# Patient Record
Sex: FEMALE | Race: WHITE | Employment: FULL TIME | URBAN - METROPOLITAN AREA
[De-identification: names, ages, dates, MRNs, and addresses within clinical notes are randomized per-mention and may not be internally consistent; named-entity substitution may affect disease eponyms.]

---

## 2019-06-12 ENCOUNTER — OFFICE VISIT (OUTPATIENT)
Dept: FAMILY MEDICINE CLINIC | Age: 54
End: 2019-06-12
Payer: COMMERCIAL

## 2019-06-12 VITALS
DIASTOLIC BLOOD PRESSURE: 86 MMHG | BODY MASS INDEX: 23.8 KG/M2 | HEART RATE: 52 BPM | OXYGEN SATURATION: 97 % | WEIGHT: 170 LBS | SYSTOLIC BLOOD PRESSURE: 122 MMHG | HEIGHT: 71 IN

## 2019-06-12 DIAGNOSIS — Z12.39 SCREENING FOR BREAST CANCER: ICD-10-CM

## 2019-06-12 DIAGNOSIS — Z00.00 ROUTINE PHYSICAL EXAMINATION: Primary | ICD-10-CM

## 2019-06-12 DIAGNOSIS — Z86.69 HISTORY OF MIGRAINE WITH AURA: ICD-10-CM

## 2019-06-12 PROCEDURE — 99386 PREV VISIT NEW AGE 40-64: CPT | Performed by: FAMILY MEDICINE

## 2019-06-12 RX ORDER — SUMATRIPTAN 25 MG/1
25 TABLET, FILM COATED ORAL
Qty: 9 TABLET | Refills: 1 | Status: SHIPPED | OUTPATIENT
Start: 2019-06-12 | End: 2020-07-17 | Stop reason: ALTCHOICE

## 2019-06-12 ASSESSMENT — PATIENT HEALTH QUESTIONNAIRE - PHQ9
SUM OF ALL RESPONSES TO PHQ QUESTIONS 1-9: 0
1. LITTLE INTEREST OR PLEASURE IN DOING THINGS: 0
2. FEELING DOWN, DEPRESSED OR HOPELESS: 0
SUM OF ALL RESPONSES TO PHQ QUESTIONS 1-9: 0
SUM OF ALL RESPONSES TO PHQ9 QUESTIONS 1 & 2: 0

## 2019-06-12 ASSESSMENT — ENCOUNTER SYMPTOMS
COLOR CHANGE: 0
SHORTNESS OF BREATH: 0
EYE DISCHARGE: 0
ABDOMINAL PAIN: 0
EYE REDNESS: 0
NAUSEA: 0

## 2019-06-12 NOTE — PROGRESS NOTES
2019    German Roberts (:  1965) is a 52 y.o. female, here for a preventive medicine evaluation. She has a history of migraine with auras and that she gets them rarely. She started getting them as a teenager. She did have one in the past couple of months where she was driving and she could see they are coming on and so she started testing herself to see if she can remember people's names and she could not. This moves her to tears when she discusses it. She is worried about dementia. She does not have any complaints from the person she lives with about any signs of forgetting things or signs of cognitive impairment. She has taken Imitrex in the past which has helped with the migraines. Review of Systems   Constitutional: Negative for unexpected weight change. HENT: Negative for congestion. Eyes: Negative for discharge and redness. Respiratory: Negative for shortness of breath. Cardiovascular: Negative for chest pain, palpitations and leg swelling. Gastrointestinal: Negative for abdominal pain and nausea. Genitourinary: Negative for flank pain. Musculoskeletal: Negative for gait problem. Skin: Negative for color change and pallor. Neurological: Negative for facial asymmetry and speech difficulty. No current headache   Hematological: Does not bruise/bleed easily. Psychiatric/Behavioral: Negative for confusion. Prior to Visit Medications    Not on File        No Known Allergies    History reviewed. No pertinent past medical history.     Past Surgical History:   Procedure Laterality Date    ADRENAL GLAND SURGERY      tumor removal     ANKLE SURGERY Left     replacement    COLONOSCOPY  10-2-15    HERNIA REPAIR         Social History     Socioeconomic History    Marital status: Single     Spouse name: Not on file    Number of children: Not on file    Years of education: Not on file    Highest education level: Not on file   Occupational History    Not on file   Social Needs    Financial resource strain: Not on file    Food insecurity:     Worry: Not on file     Inability: Not on file    Transportation needs:     Medical: Not on file     Non-medical: Not on file   Tobacco Use    Smoking status: Never Smoker    Smokeless tobacco: Never Used   Substance and Sexual Activity    Alcohol use: Yes     Comment: socially    Drug use: Never    Sexual activity: Yes   Lifestyle    Physical activity:     Days per week: Not on file     Minutes per session: Not on file    Stress: Not on file   Relationships    Social connections:     Talks on phone: Not on file     Gets together: Not on file     Attends Oriental orthodox service: Not on file     Active member of club or organization: Not on file     Attends meetings of clubs or organizations: Not on file     Relationship status: Not on file    Intimate partner violence:     Fear of current or ex partner: Not on file     Emotionally abused: Not on file     Physically abused: Not on file     Forced sexual activity: Not on file   Other Topics Concern    Not on file   Social History Narrative    Not on file       Family History   Problem Relation Age of Onset    High Blood Pressure Mother     Stroke Mother     Depression Father     Depression Brother     Stroke Maternal Grandmother            Vitals:    06/12/19 1016   BP: 122/86   Site: Right Upper Arm   Position: Sitting   Cuff Size: Medium Adult   Pulse: 52   SpO2: 97%   Weight: 170 lb (77.1 kg)   Height: 5' 11\" (1.803 m)     Estimated body mass index is 23.71 kg/m² as calculated from the following:    Height as of this encounter: 5' 11\" (1.803 m). Weight as of this encounter: 170 lb (77.1 kg). Physical Exam   Constitutional: She is oriented to person, place, and time. She appears well-developed and well-nourished. No distress. HENT:   Head: Normocephalic and atraumatic.    Right Ear: External ear normal.   Left Ear: External ear normal.   Nose: Nose normal. Orders   1. Routine physical examination  CBC    Comprehensive Metabolic Panel    Lipid Panel   2. Screening for breast cancer  EUNICE EITAN DIGITAL SCREEN BILATERAL   3. History of migraine with aura       Moriah Ask was seen today for established new doctor. Diagnoses and all orders for this visit:    Routine physical examination  -     CBC; Future  -     Comprehensive Metabolic Panel; Future  -     Lipid Panel; Future    Screening for breast cancer  -     EUNICE EITAN DIGITAL SCREEN BILATERAL; Future    History of migraine with aura    Other orders  -     SUMAtriptan (IMITREX) 25 MG tablet; Take 1 tablet by mouth once as needed for Migraine              An electronic signature was used to authenticate this note. This note is dictated, errors are possible.   --Nu Lam, DO on 6/12/2019 at 11:02 AM

## 2019-06-12 NOTE — PATIENT INSTRUCTIONS
plate that contains the fruit and vegetables to at least 50%, and decrease the amount of starches like potatoes, rice, pasta to no more than 25% of your plate. Increase your fiber intake. Fiber is found in fruits and vegetables as well as whole grains, oatmeal,  and beans. A diet with at least 5 servings of fruits and vegetables should give you about 10-20grams of fiber daily. Switch to monounsaturated fats like olive oil. . Use these types of fats where you would have used butter. Fat from olives, avocados, coconut, or other nuts is okay. Add baked or grilled fish to you diet at least 1-2 times a week. Consider an Omega 3 fatty acid supplement which can raise the HDL good cholesterol, and possible decrease the triglycerides in some patients. 2-4 grams a day is recommended. Learn more about cholesterol on the Internet. Check out these resources:    American Heart Association   Heart. [address]:   [place]    Triglycerides can be lowered without medication by exercising, and loosing weight and eating a diet lower in carbohydrates especially from flour sources,  and avoiding simple sugars. Omega 3 fatty acids can help lower triglyceride levels, 2-4 grams a day. The good cholesterol is HDL. In order to increase the good cholesterol, increasing cardiovascular exercise helps. Avoid hydrogenated oils (trans fats) in processed, bakery,  and junk food. Use monounsaturated fats such as olive oil can help raise the good cholesterol. For your weight, exercise 30 minutes 5 times weekly and improve the types of food you eat:    Protein   Best options: The American Diabetes Association (ADA) recommends lean proteins low in saturated fat, like fish or turkey. Aim for two servings of seafood each week; some fish, like salmon, have the added benefit of containing heart healthy omega-3 fats. For a vegetarian protein source, experiment with the wide variety of beans.  Nuts, which are protein and healthy fats powerhouses, are also a choice.  Worst options: Processed deli meats and hot dogs have high amounts of fat along with lots of sodium, which can increase the risk of high blood pressure. Heart attack and stroke are two common complications of diabetes, so keeping blood pressure in check is important. Grains   Best options: When choosing grains, make sure theyre whole. Decrease the amount of foods which contain flour. Whole grains such as wild rice, quinoa, and whole grain breads and cereals contain fiber, which is beneficial for digestive health, but often the grains flour products raise your blood sugar and when your blood sugar returns to normal, it can trigger the desire to eat again.   Worst options: Refined white flour doesnt contain the same health benefits as whole grains. Processed foods made with white flour include breakfast cereals, white bread, and pastries, cookies, muffins, pretzels, crackers, so avoid these options. Also try to steer clear of white rice and pasta. Dairy   Best options: With only 6 to 8 grams of carbohydrates in a serving, plain nonfat [place] yogurt is a healthy and versatile dairy option. You can add berries and enjoy it for dessert or breakfast; you can use it in recipes as a replacement for sour cream, which is high in saturated fat. Skim milk.  Worst options: Avoid all full-fat dairy products and especially packaged chocolate milk, as it also has added sugar. Avoid yogurts with added sugar. Vegetables   Best options: Non-starchy vegetables such as leafy greens, broccoli, cauliflower, asparagus, and carrots are low in carbohydrates and high in fiber and other nutrients, Juanis Carrillo says. You can eat non-starchy vegetables in abundance -- half of your plate should be filled with these veggies. If youre craving mashed potatoes, give mashed cauliflower a try.  Worst options: Stick to small portions of starchy vegetables such as corn, potatoes, and peas.  These items are nutritious, but should be eaten in moderation. The ADA groups them with grains because of their high carb content. Fruit   Best options: Fresh fruit can conquer your craving for sweets while providing antioxidants and fiber. Berries are a great option because recommended portion sizes are typically generous, which may leave you feeling more satisfied   Worst options: Avoid added sugar by eliminating fruits canned in syrup, and be aware that dried fruits have a very high sugar concentration. Also, fruit juices should be consumed rarely as theyre high in sugar and dont contain the same nutrients as whole fruit. Fats   Best options: Some types of fat actually help protect your heart. Choose the monounsaturated fats found in avocados, almonds, olives, and pecans or the polyunsaturated fats found in walnuts and sunflower oil, which can help to lower bad cholesterol.  Worst options: Saturated fats increase bad cholesterol, so limit butter, cheese, gravy, and fried foods. Keep calories from animal sources of saturated fat to less than 10 percent of your total daily intake. Trans fats are even worse than saturated fats, so avoid them completely. Look for the term hydrogenated on labels of processed foods such as packaged snacks, baked goods, and crackers.   

## 2019-07-05 DIAGNOSIS — Z00.00 ROUTINE PHYSICAL EXAMINATION: ICD-10-CM

## 2019-07-05 LAB
A/G RATIO: 1.9 (ref 1.1–2.2)
ALBUMIN SERPL-MCNC: 4.2 G/DL (ref 3.4–5)
ALP BLD-CCNC: 78 U/L (ref 40–129)
ALT SERPL-CCNC: 12 U/L (ref 10–40)
ANION GAP SERPL CALCULATED.3IONS-SCNC: 7 MMOL/L (ref 3–16)
AST SERPL-CCNC: 16 U/L (ref 15–37)
BILIRUB SERPL-MCNC: 0.5 MG/DL (ref 0–1)
BUN BLDV-MCNC: 12 MG/DL (ref 7–20)
CALCIUM SERPL-MCNC: 9.4 MG/DL (ref 8.3–10.6)
CHLORIDE BLD-SCNC: 107 MMOL/L (ref 99–110)
CHOLESTEROL, TOTAL: 162 MG/DL (ref 0–199)
CO2: 27 MMOL/L (ref 21–32)
CREAT SERPL-MCNC: 0.6 MG/DL (ref 0.6–1.1)
GFR AFRICAN AMERICAN: >60
GFR NON-AFRICAN AMERICAN: >60
GLOBULIN: 2.2 G/DL
GLUCOSE BLD-MCNC: 85 MG/DL (ref 70–99)
HCT VFR BLD CALC: 38.3 % (ref 36–48)
HDLC SERPL-MCNC: 70 MG/DL (ref 40–60)
HEMOGLOBIN: 12.8 G/DL (ref 12–16)
LDL CHOLESTEROL CALCULATED: 82 MG/DL
MCH RBC QN AUTO: 32.2 PG (ref 26–34)
MCHC RBC AUTO-ENTMCNC: 33.4 G/DL (ref 31–36)
MCV RBC AUTO: 96.6 FL (ref 80–100)
PDW BLD-RTO: 13 % (ref 12.4–15.4)
PLATELET # BLD: 258 K/UL (ref 135–450)
PMV BLD AUTO: 8.2 FL (ref 5–10.5)
POTASSIUM SERPL-SCNC: 4.9 MMOL/L (ref 3.5–5.1)
RBC # BLD: 3.97 M/UL (ref 4–5.2)
SODIUM BLD-SCNC: 141 MMOL/L (ref 136–145)
TOTAL PROTEIN: 6.4 G/DL (ref 6.4–8.2)
TRIGL SERPL-MCNC: 51 MG/DL (ref 0–150)
VLDLC SERPL CALC-MCNC: 10 MG/DL
WBC # BLD: 5.2 K/UL (ref 4–11)

## 2020-06-25 ENCOUNTER — TELEPHONE (OUTPATIENT)
Dept: FAMILY MEDICINE CLINIC | Age: 55
End: 2020-06-25

## 2020-07-17 ENCOUNTER — OFFICE VISIT (OUTPATIENT)
Dept: FAMILY MEDICINE CLINIC | Age: 55
End: 2020-07-17
Payer: COMMERCIAL

## 2020-07-17 VITALS
HEIGHT: 71 IN | WEIGHT: 165 LBS | TEMPERATURE: 98 F | OXYGEN SATURATION: 98 % | DIASTOLIC BLOOD PRESSURE: 72 MMHG | HEART RATE: 54 BPM | BODY MASS INDEX: 23.1 KG/M2 | SYSTOLIC BLOOD PRESSURE: 126 MMHG

## 2020-07-17 LAB
A/G RATIO: 1.9 (ref 1.1–2.2)
ALBUMIN SERPL-MCNC: 4.3 G/DL (ref 3.4–5)
ALP BLD-CCNC: 77 U/L (ref 40–129)
ALT SERPL-CCNC: 14 U/L (ref 10–40)
ANION GAP SERPL CALCULATED.3IONS-SCNC: 10 MMOL/L (ref 3–16)
AST SERPL-CCNC: 20 U/L (ref 15–37)
BILIRUB SERPL-MCNC: 0.5 MG/DL (ref 0–1)
BUN BLDV-MCNC: 14 MG/DL (ref 7–20)
CALCIUM SERPL-MCNC: 9.4 MG/DL (ref 8.3–10.6)
CHLORIDE BLD-SCNC: 103 MMOL/L (ref 99–110)
CHOLESTEROL, TOTAL: 181 MG/DL (ref 0–199)
CO2: 28 MMOL/L (ref 21–32)
CREAT SERPL-MCNC: 0.6 MG/DL (ref 0.6–1.1)
GFR AFRICAN AMERICAN: >60
GFR NON-AFRICAN AMERICAN: >60
GLOBULIN: 2.3 G/DL
GLUCOSE BLD-MCNC: 89 MG/DL (ref 70–99)
HCT VFR BLD CALC: 40.6 % (ref 36–48)
HDLC SERPL-MCNC: 76 MG/DL (ref 40–60)
HEMOGLOBIN: 13.8 G/DL (ref 12–16)
LDL CHOLESTEROL CALCULATED: 92 MG/DL
MCH RBC QN AUTO: 32.7 PG (ref 26–34)
MCHC RBC AUTO-ENTMCNC: 33.9 G/DL (ref 31–36)
MCV RBC AUTO: 96.3 FL (ref 80–100)
PDW BLD-RTO: 12.9 % (ref 12.4–15.4)
PLATELET # BLD: 258 K/UL (ref 135–450)
PMV BLD AUTO: 8.3 FL (ref 5–10.5)
POTASSIUM SERPL-SCNC: 4.4 MMOL/L (ref 3.5–5.1)
RBC # BLD: 4.22 M/UL (ref 4–5.2)
SARS-COV-2 ANTIBODY, TOTAL: NEGATIVE
SODIUM BLD-SCNC: 141 MMOL/L (ref 136–145)
TOTAL PROTEIN: 6.6 G/DL (ref 6.4–8.2)
TRIGL SERPL-MCNC: 65 MG/DL (ref 0–150)
VITAMIN D 25-HYDROXY: 56.5 NG/ML
VLDLC SERPL CALC-MCNC: 13 MG/DL
WBC # BLD: 5.1 K/UL (ref 4–11)

## 2020-07-17 PROCEDURE — 36415 COLL VENOUS BLD VENIPUNCTURE: CPT | Performed by: FAMILY MEDICINE

## 2020-07-17 PROCEDURE — 99396 PREV VISIT EST AGE 40-64: CPT | Performed by: FAMILY MEDICINE

## 2020-07-17 ASSESSMENT — PATIENT HEALTH QUESTIONNAIRE - PHQ9
SUM OF ALL RESPONSES TO PHQ9 QUESTIONS 1 & 2: 0
2. FEELING DOWN, DEPRESSED OR HOPELESS: 0
1. LITTLE INTEREST OR PLEASURE IN DOING THINGS: 0
SUM OF ALL RESPONSES TO PHQ QUESTIONS 1-9: 0
SUM OF ALL RESPONSES TO PHQ QUESTIONS 1-9: 0

## 2020-07-17 ASSESSMENT — ENCOUNTER SYMPTOMS
EYE REDNESS: 0
ABDOMINAL PAIN: 0
EYE DISCHARGE: 0
COLOR CHANGE: 0
NAUSEA: 0
SHORTNESS OF BREATH: 0

## 2020-07-17 NOTE — PROGRESS NOTES
2020    Zabrina Manzano (:  1965) is a 62 y.o. female, here for a preventive medicine evaluation. Chief Complaint   Patient presents with    Annual Exam     physical         Chief Complaint   Patient presents with    Annual Exam     physical     Vitals:    20 1043   BP: 126/72   Site: Left Upper Arm   Position: Sitting   Cuff Size: Medium Adult   Pulse: 54   Temp: 98 °F (36.7 °C)   SpO2: 98%   Weight: 165 lb (74.8 kg)   Height: 5' 11\" (1.803 m)     Body mass index is 23.01 kg/m². Wt Readings from Last 3 Encounters:   20 165 lb (74.8 kg)   19 170 lb (77.1 kg)   10/02/15 162 lb 4.1 oz (73.6 kg)     BP Readings from Last 3 Encounters:   20 126/72   19 122/86   12/17/15 103/61          Review of Systems   Constitutional: Negative for unexpected weight change. HENT: Negative for congestion. Eyes: Negative for discharge and redness. Respiratory: Negative for shortness of breath. Cardiovascular: Negative for chest pain, palpitations and leg swelling. Gastrointestinal: Negative for abdominal pain and nausea. Genitourinary: Negative for flank pain. Musculoskeletal: Negative for gait problem. Skin: Negative for color change and pallor. Neurological: Negative for facial asymmetry and speech difficulty. Hematological: Does not bruise/bleed easily. Psychiatric/Behavioral: Negative for confusion. When she turns her head a certain way she does get kind of a numbness and tingling sensation in her back of her head but no headaches no weakness in arms and legs. intermittently. for yrs waxes and wanes  Prior to Visit Medications    Not on File        No Known Allergies    History reviewed. No pertinent past medical history.     Past Surgical History:   Procedure Laterality Date    ADRENAL GLAND SURGERY      tumor removal     ANKLE SURGERY Left     replacement    COLONOSCOPY  10-2-15    HERNIA REPAIR         Social History     Socioeconomic History    Marital status: Single     Spouse name: Not on file    Number of children: Not on file    Years of education: Not on file    Highest education level: Not on file   Occupational History    Not on file   Social Needs    Financial resource strain: Not on file    Food insecurity     Worry: Not on file     Inability: Not on file    Transportation needs     Medical: Not on file     Non-medical: Not on file   Tobacco Use    Smoking status: Never Smoker    Smokeless tobacco: Never Used   Substance and Sexual Activity    Alcohol use: Yes     Comment: socially    Drug use: Never    Sexual activity: Yes   Lifestyle    Physical activity     Days per week: Not on file     Minutes per session: Not on file    Stress: Not on file   Relationships    Social connections     Talks on phone: Not on file     Gets together: Not on file     Attends Mosque service: Not on file     Active member of club or organization: Not on file     Attends meetings of clubs or organizations: Not on file     Relationship status: Not on file    Intimate partner violence     Fear of current or ex partner: Not on file     Emotionally abused: Not on file     Physically abused: Not on file     Forced sexual activity: Not on file   Other Topics Concern    Not on file   Social History Narrative    Not on file       Family History   Problem Relation Age of Onset    High Blood Pressure Mother     Stroke Mother     Depression Father     Depression Brother     Stroke Maternal Grandmother            Vitals:    07/17/20 1043   BP: 126/72   Site: Left Upper Arm   Position: Sitting   Cuff Size: Medium Adult   Pulse: 54   Temp: 98 °F (36.7 °C)   SpO2: 98%   Weight: 165 lb (74.8 kg)   Height: 5' 11\" (1.803 m)     Estimated body mass index is 23.01 kg/m² as calculated from the following:    Height as of this encounter: 5' 11\" (1.803 m). Weight as of this encounter: 165 lb (74.8 kg). Physical Exam  Vitals signs and nursing note reviewed. Constitutional:       General: She is not in acute distress. Appearance: Normal appearance. She is well-developed. She is not diaphoretic. HENT:      Head: Normocephalic and atraumatic. Right Ear: External ear normal.      Left Ear: External ear normal.      Nose: Nose normal.   Eyes:      General: No scleral icterus. Right eye: No discharge. Left eye: No discharge. Conjunctiva/sclera: Conjunctivae normal.   Neck:      Musculoskeletal: Normal range of motion and neck supple. Thyroid: No thyromegaly. Cardiovascular:      Rate and Rhythm: Normal rate and regular rhythm. Heart sounds: Normal heart sounds. Pulmonary:      Effort: Pulmonary effort is normal. No respiratory distress. Breath sounds: Normal breath sounds. No stridor. Abdominal:      General: Bowel sounds are normal.      Palpations: Abdomen is soft. There is no mass. Tenderness: There is no abdominal tenderness. Musculoskeletal:         General: No tenderness. Lymphadenopathy:      Cervical: No cervical adenopathy. Skin:     General: Skin is warm and dry. Coloration: Skin is not pale. Findings: No erythema or rash. Neurological:      Mental Status: She is alert and oriented to person, place, and time. Deep Tendon Reflexes: Reflexes are normal and symmetric. Reflexes normal.   Psychiatric:         Mood and Affect: Mood normal.         Behavior: Behavior normal.         Thought Content:  Thought content normal.         Judgment: Judgment normal.                 The 10-year ASCVD risk score (Gio Temple et al., 2013) is: 1.4%    Values used to calculate the score:      Age: 62 years      Sex: Female      Is Non- : No      Diabetic: No      Tobacco smoker: No      Systolic Blood Pressure: 708 mmHg      Is BP treated: No      HDL Cholesterol: 70 mg/dL      Total Cholesterol: 162 mg/dL    Immunization History   Administered Date(s) Administered    Influenza Vaccine, unspecified formulation 12/07/2010, 10/22/2013, 12/14/2015    Influenza Whole 12/07/2010    Influenza, High Dose (Fluzone 65 yrs and older) 10/22/2013    Tdap (Boostrix, Adacel) 10/22/2013       Health Maintenance   Topic Date Due    Hepatitis C screen  1965    HIV screen  12/12/1978    Cervical cancer screen  12/12/1984    Breast cancer screen  12/12/2013    Shingles Vaccine (1 of 2) 12/12/2013    Flu vaccine (1) 09/01/2020    DTaP/Tdap/Td vaccine (2 - Td) 10/22/2023    Lipid screen  07/05/2024    Colon cancer screen colonoscopy  10/02/2025    Hepatitis A vaccine  Aged Out    Hepatitis B vaccine  Aged Out    Hib vaccine  Aged Out    Meningococcal (ACWY) vaccine  Aged Out    Pneumococcal 0-64 years Vaccine  Aged Out       ASSESSMENT/PLAN:   Diagnosis Orders   1. Patient request for diagnostic testing  Covid-19, Antibody, Total   2. Annual physical exam  Covid-19, Antibody, Total    CBC    Comprehensive Metabolic Panel    Lipid Panel    Vitamin D 25 Hydroxy   3. Atypical nevi  [address] [facility], Ysabel JaramillosDO, Dermatology, Emily Acevedo was seen today for annual exam.    Diagnoses and all orders for this visit:    Patient request for diagnostic testing  -     Covid-19, Antibody, Total; Future    Annual physical exam  -     Covid-19, Antibody, Total; Future  -     CBC; Future  -     Comprehensive Metabolic Panel; Future  -     Lipid Panel; Future  -     Vitamin D 25 Hydroxy; Future    Atypical nevi  -     [address] [facility]              An electronic signature was used to authenticate this note. This note is dictated, errors are possible.   --Emily Crawford DO on 7/17/2020 at 10:57 AM

## 2020-07-17 NOTE — PATIENT INSTRUCTIONS
Your fasting blood sugar should be below 100. If it is between 100-125 that is considered high and known as prediabetes, or  impaired glucose tolerance. If your blood sugar is too high, go to diabetes. org for a diabetes prevention diet. The A1c shows your average blood sugar over the previous 3 months. It is sometimes ordered if your blood sugar is elevated  You do not have to be fasting to get it drawn. If it is 5.6 or below it means your blood sugar is in the normal range  If between 5.7 and 6.5 it is impaired glucose tolerance. If it is above 6.5 it is consistent with the diagnosis of  diabetes. Decrease high carb foods if your blood sugar is high. High carbohydrate foods are:  Breads, muffins, rolls, and bagels  Pasta, rice, corn, and grains  Potatoes, sweet potatoes  Legumes: peas beans lentils. Milk ( almond milk ok)  Most fruit except berries  Cake cookies pies ice cream candy etc.  Juices, soda, sweetened ice tea  Beer. Cholesterol, the ideal numbers explained: Total cholesterol should be below 200  The triglycerides should be below 150  The HDL, the good cholesterol should be above 40  The LDL, the bad cholesterol should be below 100. Although it can be as high as 130 if no risk factors for heart disease or no diabetes. Improve your cholesterol profile:     Cardiovascular exercise helps. Start with 15 minutes three times a week and the work up to at least 30 minutes 5 days a week. Exercise at a level that you can carry on a conversation during. Loose extra pounds. Pay attention to your serving sides and avoid eating second helpings at meals. Significantly decrease the amount of processed food, junk food, and fast food that you eat. Decrease animal sources of saturated fats, and completely avoid and eliminate  hydrogenated oils and trans fats. Check the Food Label on the food you eat and avoid foods that have hydrogenated vegetable oils in them.  That includes bakery dogs have high amounts of fat along with lots of sodium, which can increase the risk of high blood pressure. Heart attack and stroke are two common complications of diabetes, so keeping blood pressure in check is important. Grains   Best options: When choosing grains, make sure theyre whole. Decrease the amount of foods which contain flour. Whole grains such as wild rice, quinoa, and whole grain breads and cereals contain fiber, which is beneficial for digestive health, but often the grains flour products raise your blood sugar and when your blood sugar returns to normal, it can trigger the desire to eat again.   Worst options: Refined white flour doesnt contain the same health benefits as whole grains. Processed foods made with white flour include breakfast cereals, white bread, and pastries, cookies, muffins, pretzels, crackers, so avoid these options. Also try to steer clear of white rice and pasta. Dairy   Best options: With only 6 to 8 grams of carbohydrates in a serving, plain nonfat [place] yogurt is a healthy and versatile dairy option. You can add berries and enjoy it for dessert or breakfast; you can use it in recipes as a replacement for sour cream, which is high in saturated fat. Skim milk.  Worst options: Avoid all full-fat dairy products and especially packaged chocolate milk, as it also has added sugar. Avoid yogurts with added sugar. Vegetables   Best options: Non-starchy vegetables such as leafy greens, broccoli, cauliflower, asparagus, and carrots are low in carbohydrates and high in fiber and other nutrients, Esteban Stinson says. You can eat non-starchy vegetables in abundance -- half of your plate should be filled with these veggies. If youre craving mashed potatoes, give mashed cauliflower a try.  Worst options: Stick to small portions of starchy vegetables such as corn, potatoes, and peas. These items are nutritious, but should be eaten in moderation.  The ADA groups them with grains because of their high carb content. Fruit   Best options: Fresh fruit can conquer your craving for sweets while providing antioxidants and fiber. Berries are a great option because recommended portion sizes are typically generous, which may leave you feeling more satisfied   Worst options: Avoid added sugar by eliminating fruits canned in syrup, and be aware that dried fruits have a very high sugar concentration. Also, fruit juices should be consumed rarely as theyre high in sugar and dont contain the same nutrients as whole fruit. Fats   Best options: Some types of fat actually help protect your heart. Choose the monounsaturated fats found in avocados, almonds, olives, and pecans or the polyunsaturated fats found in walnuts and sunflower oil, which can help to lower bad cholesterol.  Worst options: Saturated fats increase bad cholesterol, so limit butter, cheese, gravy, and fried foods. Keep calories from animal sources of saturated fat to less than 10 percent of your total daily intake. Trans fats are even worse than saturated fats, so avoid them completely. Look for the term hydrogenated on labels of processed foods such as packaged snacks, baked goods, and crackers.  For more help losing weight:         Keep track of what you eat everyday :      Lose it! brittany for the smart phone, or loseit. com or made.com.[employer] are free online tools you can use to track your daily intake.     The online tools will help you establish your goals for weight loss and how much you should eat a day to accomplish the goal.       A BMI (body mass index) of 25 to 29 is considered overweight, a BMI of 30 and above is considered obese. BMI is found with your vitals on your After Visit Summary. In addition to the above diet info,    Iincrease the amount of fruit and vegetables you eat each day. Ideally you are eating at least 5 servings of fruit and or vegetables a day.        Drink at least 50 ounces of water a day.      Avoid or decrease processed food, fast food, and junk food.     Begin to exercise 15 minutes three times a week doing cardiovascular exercise.     Don't quit. It can take 12 weeks to break old habits before you feel like you are in a new routine. Then you have to live your new lifestyle. Rosa Hurt Once you are adjusted to your new habits you will not have to keep recording your daily intake, you should have a good idea of what is a normal amount for you to eat each day.     If you are obese, have elevated blood sugar, heart disease or a medical condition that is worsened by excess weight, I can refer you to a [employer] for education, usually covered by insurance.      Refer to [facility] and [address] [place] [address] Dermatology:  Jeanna Sinclair, MD Orbie Favre, MD Fredric Nan, MD Jonas May, MD    [address] [place] [address] [phone]   Or      [facility]  [address] [place] [address]  Phone: [phone]  Fax: [phone]     Nicole Gabriel MD      Or for a mole check: [facility]  [address] [place] [address],  [phone]    Over the counter supplements may help. Estroven or icool gave good satisfaction reports from women with hot flashes. Proper diet helps: At least 5 servings of fruits and vegetables, and low to no amounts of simple sugars. Adding soy based foods to your diet helps, such as tofu, and soy milk. You can  ground flax seeds to your salads, or milk can help. Adding melatonin supplement may help you sleep better. Vitamin D3 at 1000units a day will be beneficial in this perimenopausal time as well especially if you are low in Vitamin D. Lowering room temperatures. Consuming cool drinks. Avoiding alcohol and caffeine. For overweight and obese women, weight loss can also help.   Consider prescription medication of Effexor or Paxil which can be helpful in hot flashes.

## 2020-09-09 ENCOUNTER — OFFICE VISIT (OUTPATIENT)
Dept: PRIMARY CARE CLINIC | Age: 55
End: 2020-09-09
Payer: COMMERCIAL

## 2020-09-09 PROCEDURE — 99211 OFF/OP EST MAY X REQ PHY/QHP: CPT | Performed by: NURSE PRACTITIONER

## 2020-09-09 NOTE — PATIENT INSTRUCTIONS
Advance Care Planning  People with COVID-19 may have no symptoms, mild symptoms, such as fever, cough, and shortness of breath or they may have more severe illness, developing severe and fatal pneumonia. As a result, Advance Care Planning with attention to naming a health care decision maker (someone you trust to make healthcare decisions for you if you could not speak for yourself) and sharing other health care preferences is important BEFORE a possible health crisis. Please contact your Primary Care Provider to discuss Advance Care Planning. Preventing the Spread of Coronavirus Disease 2019 in Homes and Residential Communities  For the most recent information go to [email or web address]    Prevention steps for People with confirmed or suspected COVID-19 (including persons under investigation) who do not need to be hospitalized  and   People with confirmed COVID-19 who were hospitalized and determined to be medically stable to go home    Your healthcare provider and public health staff will evaluate whether you can be cared for at home. If it is determined that you do not need to be hospitalized and can be isolated at home, you will be monitored by staff from your local or state health department. You should follow the prevention steps below until a healthcare provider or local or state health department says you can return to your normal activities. Stay home except to get medical care  People who are mildly ill with COVID-19 are able to isolate at home during their illness. You should restrict activities outside your home, except for getting medical care. Do not go to work, school, or public areas. Avoid using public transportation, ride-sharing, or taxis. Separate yourself from other people and animals in your home  People: As much as possible, you should stay in a specific room and away from other people in your home.  Also, you should use a separate before eating or preparing food. If soap and water are not readily available, use an alcohol-based hand  with at least 60% alcohol, covering all surfaces of your hands and rubbing them together until they feel dry. Soap and water are the best option if hands are visibly dirty. Avoid touching your eyes, nose, and mouth with unwashed hands. Avoid sharing personal household items  You should not share dishes, drinking glasses, cups, eating utensils, towels, or bedding with other people or pets in your home. After using these items, they should be washed thoroughly with soap and water. Clean all high-touch surfaces everyday  High touch surfaces include counters, tabletops, doorknobs, bathroom fixtures, toilets, phones, keyboards, tablets, and bedside tables. Also, clean any surfaces that may have blood, stool, or body fluids on them. Use a household cleaning spray or wipe, according to the label instructions. Labels contain instructions for safe and effective use of the cleaning product including precautions you should take when applying the product, such as wearing gloves and making sure you have good ventilation during use of the product. Monitor your symptoms  Seek prompt medical attention if your illness is worsening (e.g., difficulty breathing). Before seeking care, call your healthcare provider and tell them that you have, or are being evaluated for, COVID-19. Put on a facemask before you enter the facility. These steps will help the healthcare providers office to keep other people in the office or waiting room from getting infected or exposed. Ask your healthcare provider to call the local or state health department. Persons who are placed under active monitoring or facilitated self-monitoring should follow instructions provided by their local health department or occupational health professionals, as appropriate. When working with your local health department check their available hours.   If you

## 2020-09-09 NOTE — PROGRESS NOTES
Estephania Morrow received a viral test for COVID-19. They were educated on isolation and quarantine as appropriate. For any symptoms, they were directed to seek care from their PCP, given contact information to establish with a doctor, directed to an urgent care or the emergency room.

## 2020-09-11 LAB — SARS-COV-2, NAA: NOT DETECTED

## 2020-12-01 ENCOUNTER — NURSE TRIAGE (OUTPATIENT)
Dept: OTHER | Facility: CLINIC | Age: 55
End: 2020-12-01

## 2020-12-01 NOTE — TELEPHONE ENCOUNTER
Reason for Disposition   [1] COVID-19 infection suspected by caller or triager AND [2] mild symptoms (cough, fever, or others) AND [9] no complications or SOB    Answer Assessment - Initial Assessment Questions  1. COVID-19 DIAGNOSIS: \"Who made your Coronavirus (COVID-19) diagnosis? \" \"Was it confirmed by a positive lab test?\" If not diagnosed by a HCP, ask \"Are there lots of cases (community spread) where you live? \" (See public health department website, if unsure)      Not diagnosed   2. ONSET: \"When did the COVID-19 symptoms start? \"       Sunday   3. WORST SYMPTOM: \"What is your worst symptom? \" (e.g., cough, fever, shortness of breath, muscle aches)      Cough   4. COUGH: \"Do you have a cough? \" If so, ask: \"How bad is the cough? \"        Yes   5. FEVER: \"Do you have a fever? \" If so, ask: \"What is your temperature, how was it measured, and when did it start? \"      no  6. RESPIRATORY STATUS: \"Describe your breathing? \" (e.g., shortness of breath, wheezing, unable to speak)       no  7. BETTER-SAME-WORSE: [facility] you getting better, staying the same or getting worse compared to yesterday? \"  If getting worse, ask, \"In what way? \"      Worse   8. HIGH RISK DISEASE: \"Do you have any chronic medical problems? \" (e.g., asthma, heart or lung disease, weak immune system, etc.)      no  9. PREGNANCY: \"Is there any chance you are pregnant? \" \"When was your last menstrual period? \"      no  10. OTHER SYMPTOMS: \"Do you have any other symptoms? \"  (e.g., chills, fatigue, headache, loss of smell or taste, muscle pain, sore throat)        Chest pain in the center    Protocols used: CORONAVIRUS (COVID-19) DIAGNOSED OR SUSPECTED-ADULT-[place]    Patient called pre-service center [facility]) [address] [place] [address] with red flag complaint. Brief description of triage: pt was exposed to COVID-19 last week and is experiencing chest discomfort and a wet cough for the last 2 days.  Pt sent to covid scheduling line for covid testing per request. Given home care instructions for symptoms. Triage indicates for patient to discuss with PCP     Care advice provided, patient verbalizes understanding; denies any other questions or concerns; instructed to call back for any new or worsening symptoms. Attention Provider: Thank you for allowing me to participate in the care of your patient. The patient was connected to triage in response to information provided to the [facility]. Please do not respond through this encounter as the response is not directed to a shared pool.

## 2020-12-02 ENCOUNTER — OFFICE VISIT (OUTPATIENT)
Dept: PRIMARY CARE CLINIC | Age: 55
End: 2020-12-02
Payer: COMMERCIAL

## 2020-12-02 PROCEDURE — 99211 OFF/OP EST MAY X REQ PHY/QHP: CPT | Performed by: NURSE PRACTITIONER

## 2020-12-02 NOTE — PATIENT INSTRUCTIONS
Advance Care Planning  People with COVID-19 may have no symptoms, mild symptoms, such as fever, cough, and shortness of breath or they may have more severe illness, developing severe and fatal pneumonia. As a result, Advance Care Planning with attention to naming a health care decision maker (someone you trust to make healthcare decisions for you if you could not speak for yourself) and sharing other health care preferences is important BEFORE a possible health crisis. Please contact your Primary Care Provider to discuss Advance Care Planning. Preventing the Spread of Coronavirus Disease 2019 in Homes and Residential Communities  For the most recent information go to [email or web address]    Prevention steps for People with confirmed or suspected COVID-19 (including persons under investigation) who do not need to be hospitalized  and   People with confirmed COVID-19 who were hospitalized and determined to be medically stable to go home    Your healthcare provider and public health staff will evaluate whether you can be cared for at home. If it is determined that you do not need to be hospitalized and can be isolated at home, you will be monitored by staff from your local or state health department. You should follow the prevention steps below until a healthcare provider or local or state health department says you can return to your normal activities. Stay home except to get medical care  People who are mildly ill with COVID-19 are able to isolate at home during their illness. You should restrict activities outside your home, except for getting medical care. Do not go to work, school, or public areas. Avoid using public transportation, ride-sharing, or taxis. Separate yourself from other people and animals in your home  People: As much as possible, you should stay in a specific room and away from other people in your home.  Also, you should use a separate before eating or preparing food. If soap and water are not readily available, use an alcohol-based hand  with at least 60% alcohol, covering all surfaces of your hands and rubbing them together until they feel dry. Soap and water are the best option if hands are visibly dirty. Avoid touching your eyes, nose, and mouth with unwashed hands. Avoid sharing personal household items  You should not share dishes, drinking glasses, cups, eating utensils, towels, or bedding with other people or pets in your home. After using these items, they should be washed thoroughly with soap and water. Clean all high-touch surfaces everyday  High touch surfaces include counters, tabletops, doorknobs, bathroom fixtures, toilets, phones, keyboards, tablets, and bedside tables. Also, clean any surfaces that may have blood, stool, or body fluids on them. Use a household cleaning spray or wipe, according to the label instructions. Labels contain instructions for safe and effective use of the cleaning product including precautions you should take when applying the product, such as wearing gloves and making sure you have good ventilation during use of the product. Monitor your symptoms  Seek prompt medical attention if your illness is worsening (e.g., difficulty breathing). Before seeking care, call your healthcare provider and tell them that you have, or are being evaluated for, COVID-19. Put on a facemask before you enter the facility. These steps will help the healthcare providers office to keep other people in the office or waiting room from getting infected or exposed. Ask your healthcare provider to call the local or state health department. Persons who are placed under active monitoring or facilitated self-monitoring should follow instructions provided by their local health department or occupational health professionals, as appropriate. When working with your local health department check their available hours.   If you have a medical emergency and need to call 911, notify the dispatch personnel that you have, or are being evaluated for COVID-19. If possible, put on a facemask before emergency medical services arrive. Discontinuing home isolation  Patients with confirmed COVID-19 should remain under home isolation precautions until the risk of secondary transmission to others is thought to be low. The decision to discontinue home isolation precautions should be made on a case-by-case basis, in consultation with healthcare providers and state and local health departments.

## 2020-12-02 NOTE — PROGRESS NOTES
Ramona Barlow received a viral test for COVID-19. They were educated on isolation and quarantine as appropriate. For any symptoms, they were directed to seek care from their PCP, given contact information to establish with a doctor, directed to an urgent care or the emergency room.

## 2020-12-04 LAB — SARS-COV-2, NAA: NOT DETECTED

## 2022-07-12 NOTE — PROGRESS NOTES
2022    Tyrese Person (:  1965) is a 60 y.o. female, here for a preventive medicine evaluation. Chief Complaint   Patient presents with    Annual Exam        Diagnosis Orders   1. Annual physical exam  CBC    Comprehensive Metabolic Panel    Lipid Panel   2. Encounter for screening mammogram for malignant neoplasm of breast       Leandra Boateng was seen today for annual exam.    Diagnoses and all orders for this visit:    Annual physical exam  -     CBC; Future  -     Comprehensive Metabolic Panel; Future  -     Lipid Panel; Future    Encounter for screening mammogram for malignant neoplasm of breast          There is no problem list on file for this patient. no chronic diseases    Review of Systems   Constitutional: Negative for unexpected weight change. HENT: Negative. Eyes: Negative for redness. Respiratory: Negative for shortness of breath. Cardiovascular: Negative for chest pain and leg swelling. Gastrointestinal: Negative for constipation, diarrhea, nausea and vomiting. Skin: Negative for pallor. Allergic/Immunologic: Negative for immunocompromised state. Psychiatric/Behavioral: Negative for confusion. Exercise: takes in foster dogs, active as teacher  Prior to Visit Medications    Medication Sig Taking? Authorizing Provider   Triamcinolone Acetonide (NASACORT ALLERGY 24HR NA) by Nasal route Yes Historical Provider, MD        No Known Allergies    History reviewed. No pertinent past medical history.     Past Surgical History:   Procedure Laterality Date    ADRENAL GLAND SURGERY      tumor removal     ANKLE SURGERY Left     replacement    COLONOSCOPY  10-2-15    HERNIA REPAIR         Social History     Socioeconomic History    Marital status: Single     Spouse name: Not on file    Number of children: Not on file    Years of education: Not on file    Highest education level: Not on file   Occupational History    Not on file   Tobacco Use    Smoking status: Never Smoker    Smokeless tobacco: Never Used   Vaping Use    Vaping Use: Never used   Substance and Sexual Activity    Alcohol use: Yes     Comment: socially    Drug use: Never    Sexual activity: Yes   Other Topics Concern    Not on file   Social History Narrative    Not on file     Social Determinants of Health     Financial Resource Strain: Low Risk     Difficulty of Paying Living Expenses: Not hard at all   Food Insecurity: No Food Insecurity    Worried About Running Out of Food in the Last Year: Never true    [address] in the Last Year: Never true   Transportation Needs:     Lack of Transportation (Medical): Not on file    Lack of Transportation (Non-Medical):  Not on file   Physical Activity:     Days of Exercise per Week: Not on file    Minutes of Exercise per Session: Not on file   Stress:     Feeling of Stress : Not on file   Social Connections:     Frequency of Communication with Friends and Family: Not on file    Frequency of Social Gatherings with Friends and Family: Not on file    Attends Yazdanism Services: Not on file    Active Member of [address] [place] [address] or Organizations: Not on file    Attends Club or Organization Meetings: Not on file    Marital Status: Not on file   Intimate Partner Violence:     Fear of Current or Ex-Partner: Not on file    Emotionally Abused: Not on file    Physically Abused: Not on file    Sexually Abused: Not on file   Housing Stability:     Unable to Pay for Housing in the Last Year: Not on file    Number of [place] in the Last Year: Not on file    Unstable Housing in the Last Year: Not on file       Family History   Problem Relation Age of Onset    High Blood Pressure Mother     Stroke Mother     Depression Father     Depression Brother     Stroke Maternal Grandmother            Vitals:    07/13/22 0909   BP: 122/76   Site: Left Upper Arm   Position: Sitting   Cuff Size: Medium Adult   Pulse: 65   SpO2: 98%   Weight: 163 lb (73.9 kg)   Height: 5' 11\" (1.803 m) Estimated body mass index is 22.73 kg/m² as calculated from the following:    Height as of this encounter: 5' 11\" (1.803 m). Weight as of this encounter: 163 lb (73.9 kg). BP Readings from Last 5 Encounters:   07/13/22 122/76   07/17/20 126/72   06/12/19 122/86   12/17/15 103/61   10/02/15 114/74        Oriented to person, place, and time. HEENT:Normocephalic, atraumatic. No scleral icterus. Pupils normal  Heart: Regular Rate and Rhythm. No murmurs. No thyromegaly  Respirations: lungs clear to auscultation bilaterally. Abdomin: Bowel sounds present. no masses or tenderness  Extremities: No peripheral edema. No erythema.         The 10-year ASCVD risk score (Félix Salas, et al., 2013) is: 1.7%    Values used to calculate the score:      Age: 60 years      Sex: Female      Is Non- : No      Diabetic: No      Tobacco smoker: No      Systolic Blood Pressure: 418 mmHg      Is BP treated: No      HDL Cholesterol: 76 mg/dL      Total Cholesterol: 181 mg/dL    Immunization History   Administered Date(s) Administered    COVID-19, PFIZER PURPLE top, DILUTE for use, (age 15 y+), 30mcg/0.3mL 02/19/2021, 03/12/2021, 11/16/2021    Influenza Vaccine, unspecified formulation 12/07/2010, 10/22/2013, 12/14/2015    Influenza Virus Vaccine 12/14/2015    Influenza Whole 12/07/2010    Influenza, High Dose (Fluzone 65 yrs and older) 10/22/2013    Influenza, Annmarie Roughen, Recombinant, IM PF (Flublok 18 yrs and older) 11/16/2021, 12/02/2021    Tdap (Boostrix, Adacel) 10/22/2013    Zoster Recombinant (Shingrix) 11/16/2021       Health Maintenance   Topic Date Due    Depression Screen  Never done    HIV screen  Never done    Hepatitis C screen  Never done    Cervical cancer screen  Never done    Breast cancer screen  Never done    Shingles vaccine (2 of 2) 01/11/2022    Flu vaccine (1) 09/01/2022    DTaP/Tdap/Td vaccine (2 - Td or Tdap) 10/22/2023    Lipids  07/17/2025    Colorectal Cancer Screen 10/02/2025    COVID-19 Vaccine  Completed    Hepatitis A vaccine  Aged Out    Hepatitis B vaccine  Aged Out    Hib vaccine  Aged Out    Meningococcal (ACWY) vaccine  Aged Out    Pneumococcal 0-64 years Vaccine  [employer] was used to authenticate this note. This note is dictated, errors are possible.   --Raina Reyes, DO on 7/13/2022 at 9:27 AM

## 2022-07-13 ENCOUNTER — HOSPITAL ENCOUNTER (OUTPATIENT)
Dept: MAMMOGRAPHY | Age: 57
Discharge: HOME | End: 2022-07-13
Payer: COMMERCIAL

## 2022-07-13 ENCOUNTER — OFFICE VISIT (OUTPATIENT)
Dept: FAMILY MEDICINE CLINIC | Age: 57
End: 2022-07-13
Payer: COMMERCIAL

## 2022-07-13 VITALS
WEIGHT: 163 LBS | HEART RATE: 65 BPM | HEIGHT: 71 IN | DIASTOLIC BLOOD PRESSURE: 76 MMHG | BODY MASS INDEX: 22.82 KG/M2 | OXYGEN SATURATION: 98 % | SYSTOLIC BLOOD PRESSURE: 122 MMHG

## 2022-07-13 DIAGNOSIS — Z00.00 ANNUAL PHYSICAL EXAM: ICD-10-CM

## 2022-07-13 DIAGNOSIS — Z12.31 ENCOUNTER FOR SCREENING MAMMOGRAM FOR MALIGNANT NEOPLASM OF BREAST: ICD-10-CM

## 2022-07-13 DIAGNOSIS — Z00.00 ANNUAL PHYSICAL EXAM: Primary | ICD-10-CM

## 2022-07-13 DIAGNOSIS — Z12.31 BREAST CANCER SCREENING BY MAMMOGRAM: ICD-10-CM

## 2022-07-13 LAB
A/G RATIO: 2 (ref 1.1–2.2)
ALBUMIN SERPL-MCNC: 4.5 G/DL (ref 3.4–5)
ALP BLD-CCNC: 98 U/L (ref 40–129)
ALT SERPL-CCNC: 16 U/L (ref 10–40)
ANION GAP SERPL CALCULATED.3IONS-SCNC: 11 MMOL/L (ref 3–16)
AST SERPL-CCNC: 24 U/L (ref 15–37)
BILIRUB SERPL-MCNC: 0.5 MG/DL (ref 0–1)
BUN BLDV-MCNC: 11 MG/DL (ref 7–20)
CALCIUM SERPL-MCNC: 9.7 MG/DL (ref 8.3–10.6)
CHLORIDE BLD-SCNC: 102 MMOL/L (ref 99–110)
CHOLESTEROL, TOTAL: 175 MG/DL (ref 0–199)
CO2: 27 MMOL/L (ref 21–32)
CREAT SERPL-MCNC: 0.7 MG/DL (ref 0.6–1.1)
GFR AFRICAN AMERICAN: >60
GFR NON-AFRICAN AMERICAN: >60
GLUCOSE BLD-MCNC: 88 MG/DL (ref 70–99)
HCT VFR BLD CALC: 38.9 % (ref 36–48)
HDLC SERPL-MCNC: 72 MG/DL (ref 40–60)
HEMOGLOBIN: 13.2 G/DL (ref 12–16)
LDL CHOLESTEROL CALCULATED: 87 MG/DL
MCH RBC QN AUTO: 31.5 PG (ref 26–34)
MCHC RBC AUTO-ENTMCNC: 33.8 G/DL (ref 31–36)
MCV RBC AUTO: 93.1 FL (ref 80–100)
PDW BLD-RTO: 13.1 % (ref 12.4–15.4)
PLATELET # BLD: 304 K/UL (ref 135–450)
PMV BLD AUTO: 7.7 FL (ref 5–10.5)
POTASSIUM SERPL-SCNC: 4.1 MMOL/L (ref 3.5–5.1)
RBC # BLD: 4.18 M/UL (ref 4–5.2)
SODIUM BLD-SCNC: 140 MMOL/L (ref 136–145)
TOTAL PROTEIN: 6.7 G/DL (ref 6.4–8.2)
TRIGL SERPL-MCNC: 82 MG/DL (ref 0–150)
VLDLC SERPL CALC-MCNC: 16 MG/DL
WBC # BLD: 5.4 K/UL (ref 4–11)

## 2022-07-13 PROCEDURE — 77067 SCR MAMMO BI INCL CAD: CPT

## 2022-07-13 PROCEDURE — 99396 PREV VISIT EST AGE 40-64: CPT | Performed by: FAMILY MEDICINE

## 2022-07-13 PROCEDURE — 77063 BREAST TOMOSYNTHESIS BI: CPT

## 2022-07-13 SDOH — ECONOMIC STABILITY: FOOD INSECURITY: WITHIN THE PAST 12 MONTHS, YOU WORRIED THAT YOUR FOOD WOULD RUN OUT BEFORE YOU GOT MONEY TO BUY MORE.: NEVER TRUE

## 2022-07-13 SDOH — ECONOMIC STABILITY: FOOD INSECURITY: WITHIN THE PAST 12 MONTHS, THE FOOD YOU BOUGHT JUST DIDN'T LAST AND YOU DIDN'T HAVE MONEY TO GET MORE.: NEVER TRUE

## 2022-07-13 ASSESSMENT — SOCIAL DETERMINANTS OF HEALTH (SDOH): HOW HARD IS IT FOR YOU TO PAY FOR THE VERY BASICS LIKE FOOD, HOUSING, MEDICAL CARE, AND HEATING?: NOT HARD AT ALL

## 2022-07-13 ASSESSMENT — PATIENT HEALTH QUESTIONNAIRE - PHQ9
SUM OF ALL RESPONSES TO PHQ QUESTIONS 1-9: 0
SUM OF ALL RESPONSES TO PHQ9 QUESTIONS 1 & 2: 0
SUM OF ALL RESPONSES TO PHQ QUESTIONS 1-9: 0
SUM OF ALL RESPONSES TO PHQ QUESTIONS 1-9: 0
2. FEELING DOWN, DEPRESSED OR HOPELESS: 0
SUM OF ALL RESPONSES TO PHQ QUESTIONS 1-9: 0
1. LITTLE INTEREST OR PLEASURE IN DOING THINGS: 0

## 2022-07-13 NOTE — PATIENT INSTRUCTIONS
Vit D 1000IU  Calcium 1200 mg  Daily with multivitamin. Your fasting blood sugar should be below 100. If it is between 100-125 that is considered high and known as prediabetes, or  impaired glucose tolerance. If your blood sugar is too high, go to diabetes. org for a diabetes prevention diet. The A1c shows your average blood sugar over the previous 3 months. It is sometimes ordered if your blood sugar is elevated  You do not have to be fasting to get it drawn. If it is 5.6 or below it means your blood sugar is in the normal range  If between 5.7 and 6.5 it is impaired glucose tolerance. If it is above 6.5 it is consistent with the diagnosis of  diabetes. Decrease high carb foods if your blood sugar is high. High carbohydrate foods are:  Breads, muffins, rolls, and bagels  Pasta, rice, corn, and grains  Potatoes, sweet potatoes  Legumes: peas beans lentils. Milk ( almond milk ok)  Most fruit except berries  Cake cookies pies ice cream candy etc.  Juices, soda, sweetened ice tea  Beer. Cholesterol, the ideal numbers explained: Total cholesterol should be below 200  The triglycerides should be below 150  The HDL, the good cholesterol should be above 40  The LDL, the bad cholesterol should be below 100. Although it can be as high as 130 if no risk factors for heart disease or no diabetes. Improve your cholesterol profile:     Cardiovascular exercise helps. Start with 15 minutes three times a week and the work up to at least 30 minutes 5 days a week. Exercise at a level that you can carry on a conversation during. Loose extra pounds. Pay attention to your serving sides and avoid eating second helpings at meals. Significantly decrease the amount of processed food, junk food, and fast food that you eat. Decrease animal sources of saturated fats, and completely avoid and eliminate  hydrogenated oils and trans fats.  Check the Food Label on the food you eat and avoid foods that also a choice.  Worst options: Processed deli meats and hot dogs have high amounts of fat along with lots of sodium, which can increase the risk of high blood pressure. Heart attack and stroke are two common complications of diabetes, so keeping blood pressure in check is important. Grains   Best options: When choosing grains, make sure theyre whole. Decrease the amount of foods which contain flour. Whole grains such as wild rice, quinoa, and whole grain breads and cereals contain fiber, which is beneficial for digestive health, but often the grains flour products raise your blood sugar and when your blood sugar returns to normal, it can trigger the desire to eat again.   Worst options: Refined white flour doesnt contain the same health benefits as whole grains. Processed foods made with white flour include breakfast cereals, white bread, and pastries, cookies, muffins, pretzels, crackers, so avoid these options. Also try to steer clear of white rice and pasta. Dairy   Best options: With only 6 to 8 grams of carbohydrates in a serving, plain nonfat [place] yogurt is a healthy and versatile dairy option. You can add berries and enjoy it for dessert or breakfast; you can use it in recipes as a replacement for sour cream, which is high in saturated fat. Skim milk.  Worst options: Avoid all full-fat dairy products and especially packaged chocolate milk, as it also has added sugar. Avoid yogurts with added sugar. Vegetables   Best options: Non-starchy vegetables such as leafy greens, broccoli, cauliflower, asparagus, and carrots are low in carbohydrates and high in fiber and other nutrients, Jose Odonnell says. You can eat non-starchy vegetables in abundance -- half of your plate should be filled with these veggies. If youre craving mashed potatoes, give mashed cauliflower a try.  Worst options: Stick to small portions of starchy vegetables such as corn, potatoes, and peas.  These items are nutritious, but should be eaten in moderation. The ADA groups them with grains because of their high carb content. Fruit   Best options: Fresh fruit can conquer your craving for sweets while providing antioxidants and fiber. Berries are a great option because recommended portion sizes are typically generous, which may leave you feeling more satisfied   Worst options: Avoid added sugar by eliminating fruits canned in syrup, and be aware that dried fruits have a very high sugar concentration. Also, fruit juices should be consumed rarely as theyre high in sugar and dont contain the same nutrients as whole fruit. Fats   Best options: Some types of fat actually help protect your heart. Choose the monounsaturated fats found in avocados, almonds, olives, and pecans or the polyunsaturated fats found in walnuts and sunflower oil, which can help to lower bad cholesterol.  Worst options: Saturated fats increase bad cholesterol, so limit butter, cheese, gravy, and fried foods. Keep calories from animal sources of saturated fat to less than 10 percent of your total daily intake. Trans fats are even worse than saturated fats, so avoid them completely. Look for the term hydrogenated on labels of processed foods such as packaged snacks, baked goods, and crackers.  For more help losing weight:         Keep track of what you eat everyday :      Lose it! brittany for the smart phone, or loseit. com or myfitnesspal.com are free online tools you can use to track your daily intake.     The online tools will help you establish your goals for weight loss and how much you should eat a day to accomplish the goal.       A BMI (body mass index) of 25 to 29 is considered overweight, a BMI of 30 and above is considered obese. BMI is found with your vitals on your After Visit Summary. In addition to the above diet info,    Iincrease the amount of fruit and vegetables you eat each day.  Ideally you are eating at least 5 servings of fruit and or

## 2022-07-15 ENCOUNTER — TELEPHONE (OUTPATIENT)
Dept: FAMILY MEDICINE CLINIC | Age: 57
End: 2022-07-15

## 2024-01-10 NOTE — PROGRESS NOTES
Psychiatric/Behavioral: Negative for confusion.     Prior to Visit Medications    Medication Sig Taking? Authorizing Provider   Multiple Vitamins-Minerals (THERAPEUTIC MULTIVITAMIN-MINERALS) tablet Take 1 tablet by mouth daily Yes Adalgisa Boyd MD   Cholecalciferol (VITAMIN D) 50 MCG (2000 UT) CAPS capsule Take by mouth Yes Adalgisa Boyd MD   Triamcinolone Acetonide (NASACORT ALLERGY 24HR NA) by Nasal route Yes Adalgisa Boyd MD        No Known Allergies    No past medical history on file.    Past Surgical History:   Procedure Laterality Date    ADRENAL GLAND SURGERY      tumor removal     ANKLE SURGERY Left     replacement    COLONOSCOPY  10-2-15    HERNIA REPAIR         Social History     Socioeconomic History    Marital status: Single     Spouse name: Not on file    Number of children: Not on file    Years of education: Not on file    Highest education level: Not on file   Occupational History    Not on file   Tobacco Use    Smoking status: Never    Smokeless tobacco: Never   Vaping Use    Vaping Use: Never used   Substance and Sexual Activity    Alcohol use: Yes     Comment: socially    Drug use: Never    Sexual activity: Yes   Other Topics Concern    Not on file   Social History Narrative    Not on file     Social Determinants of Health     Financial Resource Strain: Low Risk  (1/12/2024)    Overall Financial Resource Strain (CARDIA)     Difficulty of Paying Living Expenses: Not hard at all   Food Insecurity: No Food Insecurity (1/12/2024)    Hunger Vital Sign     Worried About Running Out of Food in the Last Year: Never true     Ran Out of Food in the Last Year: Never true   Transportation Needs: Unknown (1/12/2024)    PRAPARE - Transportation     Lack of Transportation (Medical): Not on file     Lack of Transportation (Non-Medical): No   Physical Activity: Not on file   Stress: Not on file   Social Connections: Not on file   Intimate Partner Violence: Not on file   Housing Stability:

## 2024-01-12 ENCOUNTER — OFFICE VISIT (OUTPATIENT)
Dept: FAMILY MEDICINE CLINIC | Age: 59
End: 2024-01-12
Payer: COMMERCIAL

## 2024-01-12 VITALS
BODY MASS INDEX: 22.12 KG/M2 | HEIGHT: 71 IN | SYSTOLIC BLOOD PRESSURE: 120 MMHG | RESPIRATION RATE: 14 BRPM | OXYGEN SATURATION: 97 % | DIASTOLIC BLOOD PRESSURE: 70 MMHG | HEART RATE: 69 BPM | TEMPERATURE: 97 F | WEIGHT: 158 LBS

## 2024-01-12 DIAGNOSIS — M79.641 BILATERAL HAND PAIN: ICD-10-CM

## 2024-01-12 DIAGNOSIS — Z12.31 ENCOUNTER FOR SCREENING MAMMOGRAM FOR MALIGNANT NEOPLASM OF BREAST: ICD-10-CM

## 2024-01-12 DIAGNOSIS — M79.671 RIGHT FOOT PAIN: ICD-10-CM

## 2024-01-12 DIAGNOSIS — M79.642 BILATERAL HAND PAIN: ICD-10-CM

## 2024-01-12 DIAGNOSIS — Z00.00 ANNUAL PHYSICAL EXAM: Primary | ICD-10-CM

## 2024-01-12 PROCEDURE — 99396 PREV VISIT EST AGE 40-64: CPT | Performed by: FAMILY MEDICINE

## 2024-01-12 RX ORDER — M-VIT,TX,IRON,MINS/CALC/FOLIC 27MG-0.4MG
1 TABLET ORAL DAILY
COMMUNITY

## 2024-01-12 RX ORDER — MULTIVIT-MIN/IRON/FOLIC ACID/K 18-600-40
CAPSULE ORAL
COMMUNITY

## 2024-01-12 SDOH — ECONOMIC STABILITY: HOUSING INSECURITY
IN THE LAST 12 MONTHS, WAS THERE A TIME WHEN YOU DID NOT HAVE A STEADY PLACE TO SLEEP OR SLEPT IN A SHELTER (INCLUDING NOW)?: NO

## 2024-01-12 SDOH — ECONOMIC STABILITY: FOOD INSECURITY: WITHIN THE PAST 12 MONTHS, THE FOOD YOU BOUGHT JUST DIDN'T LAST AND YOU DIDN'T HAVE MONEY TO GET MORE.: NEVER TRUE

## 2024-01-12 SDOH — ECONOMIC STABILITY: FOOD INSECURITY: WITHIN THE PAST 12 MONTHS, YOU WORRIED THAT YOUR FOOD WOULD RUN OUT BEFORE YOU GOT MONEY TO BUY MORE.: NEVER TRUE

## 2024-01-12 SDOH — ECONOMIC STABILITY: INCOME INSECURITY: HOW HARD IS IT FOR YOU TO PAY FOR THE VERY BASICS LIKE FOOD, HOUSING, MEDICAL CARE, AND HEATING?: NOT HARD AT ALL

## 2024-01-12 ASSESSMENT — PATIENT HEALTH QUESTIONNAIRE - PHQ9
SUM OF ALL RESPONSES TO PHQ QUESTIONS 1-9: 0
1. LITTLE INTEREST OR PLEASURE IN DOING THINGS: 0
2. FEELING DOWN, DEPRESSED OR HOPELESS: 0
SUM OF ALL RESPONSES TO PHQ9 QUESTIONS 1 & 2: 0
1. LITTLE INTEREST OR PLEASURE IN DOING THINGS: NOT AT ALL
SUM OF ALL RESPONSES TO PHQ QUESTIONS 1-9: 0
SUM OF ALL RESPONSES TO PHQ QUESTIONS 1-9: 0
2. FEELING DOWN, DEPRESSED OR HOPELESS: NOT AT ALL
SUM OF ALL RESPONSES TO PHQ9 QUESTIONS 1 & 2: 0
SUM OF ALL RESPONSES TO PHQ QUESTIONS 1-9: 0

## 2024-01-15 ENCOUNTER — HOSPITAL ENCOUNTER (OUTPATIENT)
Dept: MAMMOGRAPHY | Age: 59
Discharge: HOME | End: 2024-01-15
Payer: COMMERCIAL

## 2024-01-15 DIAGNOSIS — Z12.31 ENCOUNTER FOR SCREENING MAMMOGRAM FOR MALIGNANT NEOPLASM OF BREAST: ICD-10-CM

## 2024-01-15 DIAGNOSIS — M79.642 BILATERAL HAND PAIN: ICD-10-CM

## 2024-01-15 DIAGNOSIS — Z00.00 ANNUAL PHYSICAL EXAM: ICD-10-CM

## 2024-01-15 DIAGNOSIS — M79.641 BILATERAL HAND PAIN: ICD-10-CM

## 2024-01-15 DIAGNOSIS — M79.671 RIGHT FOOT PAIN: ICD-10-CM

## 2024-01-15 LAB
25(OH)D3 SERPL-MCNC: 41.5 NG/ML
DEPRECATED RDW RBC AUTO: 13.6 % (ref 12.4–15.4)
ERYTHROCYTE [SEDIMENTATION RATE] IN BLOOD BY WESTERGREN METHOD: 3 MM/HR (ref 0–30)
HCT VFR BLD AUTO: 39 % (ref 36–48)
HGB BLD-MCNC: 13.1 G/DL (ref 12–16)
MCH RBC QN AUTO: 31.4 PG (ref 26–34)
MCHC RBC AUTO-ENTMCNC: 33.6 G/DL (ref 31–36)
MCV RBC AUTO: 93.4 FL (ref 80–100)
PLATELET # BLD AUTO: 297 K/UL (ref 135–450)
PMV BLD AUTO: 8 FL (ref 5–10.5)
RBC # BLD AUTO: 4.17 M/UL (ref 4–5.2)
WBC # BLD AUTO: 4 K/UL (ref 4–11)

## 2024-01-15 PROCEDURE — 77063 BREAST TOMOSYNTHESIS BI: CPT

## 2024-01-16 LAB
ALBUMIN SERPL-MCNC: 4.5 G/DL (ref 3.4–5)
ALBUMIN/GLOB SERPL: 2.8 {RATIO} (ref 1.1–2.2)
ALP SERPL-CCNC: 91 U/L (ref 40–129)
ALT SERPL-CCNC: 16 U/L (ref 10–40)
ANA SER QL IA: NEGATIVE
ANION GAP SERPL CALCULATED.3IONS-SCNC: 9 MMOL/L (ref 3–16)
AST SERPL-CCNC: 21 U/L (ref 15–37)
BILIRUB SERPL-MCNC: 0.5 MG/DL (ref 0–1)
BUN SERPL-MCNC: 15 MG/DL (ref 7–20)
CALCIUM SERPL-MCNC: 9.3 MG/DL (ref 8.3–10.6)
CHLORIDE SERPL-SCNC: 106 MMOL/L (ref 99–110)
CHOLEST SERPL-MCNC: 191 MG/DL (ref 0–199)
CO2 SERPL-SCNC: 29 MMOL/L (ref 21–32)
CREAT SERPL-MCNC: 0.7 MG/DL (ref 0.6–1.2)
GFR SERPLBLD CREATININE-BSD FMLA CKD-EPI: >60 ML/MIN/{1.73_M2}
GLUCOSE SERPL-MCNC: 81 MG/DL (ref 70–99)
HDLC SERPL-MCNC: 74 MG/DL (ref 40–60)
LDLC SERPL CALC-MCNC: 103 MG/DL
POTASSIUM SERPL-SCNC: 4.5 MMOL/L (ref 3.5–5.1)
PROT SERPL-MCNC: 6.1 G/DL (ref 6.4–8.2)
SODIUM SERPL-SCNC: 144 MMOL/L (ref 136–145)
TRIGL SERPL-MCNC: 69 MG/DL (ref 0–150)
TSH SERPL DL<=0.005 MIU/L-ACNC: 2.69 UIU/ML (ref 0.27–4.2)
VLDLC SERPL CALC-MCNC: 14 MG/DL

## 2024-01-17 LAB — SMA IGG SER-ACNC: 8 UNITS (ref 0–19)

## 2024-01-23 ENCOUNTER — PATIENT MESSAGE (OUTPATIENT)
Dept: FAMILY MEDICINE CLINIC | Age: 59
End: 2024-01-23

## 2024-01-24 NOTE — TELEPHONE ENCOUNTER
From: SUDHA BANUELOS  To: Kaycee Osuna  Sent: 1/23/2024 3:40 PM EST  Subject: Results    Blood count good. Electrolytes, calcium, blood sugar, liver, and kidney function good, cholesterol is good with a high good cholesterol HDL. Vitamin D and thyroid are normal.  Protein is a little low, you can increase protein rich foods in your diet.  Sed rate is normal.

## 2025-03-24 ENCOUNTER — TELEPHONE (OUTPATIENT)
Age: 60
End: 2025-03-24

## 2025-03-24 SDOH — HEALTH STABILITY: PHYSICAL HEALTH: ON AVERAGE, HOW MANY MINUTES DO YOU ENGAGE IN EXERCISE AT THIS LEVEL?: 60 MIN

## 2025-03-24 SDOH — HEALTH STABILITY: PHYSICAL HEALTH: ON AVERAGE, HOW MANY DAYS PER WEEK DO YOU ENGAGE IN MODERATE TO STRENUOUS EXERCISE (LIKE A BRISK WALK)?: 5 DAYS

## 2025-03-24 NOTE — TELEPHONE ENCOUNTER
Spoke with pt to notify we received the ODVV and advise her to be evaluated \"in person\" for finger bruising with poor circulation concern. Pt verbalized she understood.

## 2025-03-25 ENCOUNTER — HOSPITAL ENCOUNTER (OUTPATIENT)
Dept: GENERAL RADIOLOGY | Age: 60
Discharge: HOME | End: 2025-03-25
Payer: COMMERCIAL

## 2025-03-25 ENCOUNTER — OFFICE VISIT (OUTPATIENT)
Dept: FAMILY MEDICINE CLINIC | Age: 60
End: 2025-03-25

## 2025-03-25 VITALS
SYSTOLIC BLOOD PRESSURE: 102 MMHG | OXYGEN SATURATION: 98 % | DIASTOLIC BLOOD PRESSURE: 68 MMHG | TEMPERATURE: 97.1 F | RESPIRATION RATE: 16 BRPM | WEIGHT: 163 LBS | BODY MASS INDEX: 22.73 KG/M2 | HEART RATE: 67 BPM

## 2025-03-25 DIAGNOSIS — M25.541 PAIN INVOLVING JOINT OF FINGER OF RIGHT HAND: ICD-10-CM

## 2025-03-25 DIAGNOSIS — M25.541 PAIN INVOLVING JOINT OF FINGER OF RIGHT HAND: Primary | ICD-10-CM

## 2025-03-25 PROCEDURE — 73140 X-RAY EXAM OF FINGER(S): CPT

## 2025-03-25 SDOH — ECONOMIC STABILITY: FOOD INSECURITY: WITHIN THE PAST 12 MONTHS, THE FOOD YOU BOUGHT JUST DIDN'T LAST AND YOU DIDN'T HAVE MONEY TO GET MORE.: NEVER TRUE

## 2025-03-25 SDOH — ECONOMIC STABILITY: FOOD INSECURITY: WITHIN THE PAST 12 MONTHS, YOU WORRIED THAT YOUR FOOD WOULD RUN OUT BEFORE YOU GOT MONEY TO BUY MORE.: NEVER TRUE

## 2025-03-25 ASSESSMENT — PATIENT HEALTH QUESTIONNAIRE - PHQ9
SUM OF ALL RESPONSES TO PHQ QUESTIONS 1-9: 0
1. LITTLE INTEREST OR PLEASURE IN DOING THINGS: NOT AT ALL
2. FEELING DOWN, DEPRESSED OR HOPELESS: NOT AT ALL
SUM OF ALL RESPONSES TO PHQ QUESTIONS 1-9: 0
SUM OF ALL RESPONSES TO PHQ QUESTIONS 1-9: 0
DEPRESSION UNABLE TO ASSESS: FUNCTIONAL CAPACITY MOTIVATION LIMITS ACCURACY
SUM OF ALL RESPONSES TO PHQ QUESTIONS 1-9: 0

## 2025-03-25 ASSESSMENT — ANXIETY QUESTIONNAIRES
IF YOU CHECKED OFF ANY PROBLEMS ON THIS QUESTIONNAIRE, HOW DIFFICULT HAVE THESE PROBLEMS MADE IT FOR YOU TO DO YOUR WORK, TAKE CARE OF THINGS AT HOME, OR GET ALONG WITH OTHER PEOPLE: NOT DIFFICULT AT ALL
6. BECOMING EASILY ANNOYED OR IRRITABLE: NOT AT ALL
7. FEELING AFRAID AS IF SOMETHING AWFUL MIGHT HAPPEN: NOT AT ALL
GAD7 TOTAL SCORE: 0
5. BEING SO RESTLESS THAT IT IS HARD TO SIT STILL: NOT AT ALL
2. NOT BEING ABLE TO STOP OR CONTROL WORRYING: NOT AT ALL
3. WORRYING TOO MUCH ABOUT DIFFERENT THINGS: NOT AT ALL
4. TROUBLE RELAXING: NOT AT ALL
1. FEELING NERVOUS, ANXIOUS, OR ON EDGE: NOT AT ALL

## 2025-03-25 NOTE — PROGRESS NOTES
3/25/25    Chief Complaint   Patient presents with    New Patient     Will need fasting labs has been over a year     Joint Pain     Finger pain, gets bruised and numb along with left small toe      HPI: Kaycee Osuna is a 59 y.o. female who presents to establish care and for right index finger injury. Pt has PMH of osteoarthritis in her ankle s/p ankle replacement surgery in  then again in 2017. Additional SH of adrenal tumor removal in her 20s. FH of stroke in her mother. Pt does not smoke, drinks approx 7 drinks/week and no drug use. Pt is not currently sexually active. Pt is .     R index finger pain: Pt has been having pain in index finger for months after pulling weed in the garden. Pain is rated a 7/10 at its worst;described as a sharp pain. Pt has bruising and feeling of fullness in the distal part of index finger that comes and goes and lasts a couple of days. Pt has a hx of osteoarthritis in her fingers and hands.     No past medical history on file.    Past Surgical History:   Procedure Laterality Date    ADRENAL GLAND SURGERY      tumor removal     ANKLE SURGERY Left     replacement    COLONOSCOPY  10/02/2015    HERNIA REPAIR      TONSILLECTOMY  1968       Social History     Tobacco Use    Smoking status: Never    Smokeless tobacco: Never   Vaping Use    Vaping status: Never Used   Substance Use Topics    Alcohol use: Yes     Alcohol/week: 7.0 standard drinks of alcohol     Types: 3 Glasses of wine, 4 Cans of beer per week     Comment: socially    Drug use: Never       Family History   Problem Relation Age of Onset    High Blood Pressure Mother          after having strokes after back surgery in 3009    Stroke Mother     Depression Father          of suicide in     Depression Brother         Suicide 2010    Stroke Maternal Grandmother      Review of Systems   Constitutional:  Negative for chills and fever.   Musculoskeletal:  Positive for arthralgias.     Physical

## 2025-03-26 ENCOUNTER — RESULTS FOLLOW-UP (OUTPATIENT)
Dept: FAMILY MEDICINE CLINIC | Age: 60
End: 2025-03-26

## 2025-04-25 ENCOUNTER — OFFICE VISIT (OUTPATIENT)
Dept: FAMILY MEDICINE CLINIC | Age: 60
End: 2025-04-25

## 2025-04-25 VITALS
TEMPERATURE: 97.8 F | BODY MASS INDEX: 23.29 KG/M2 | OXYGEN SATURATION: 98 % | RESPIRATION RATE: 16 BRPM | SYSTOLIC BLOOD PRESSURE: 102 MMHG | DIASTOLIC BLOOD PRESSURE: 63 MMHG | HEART RATE: 74 BPM | WEIGHT: 167 LBS

## 2025-04-25 DIAGNOSIS — Z00.00 ANNUAL PHYSICAL EXAM: Primary | ICD-10-CM

## 2025-04-25 DIAGNOSIS — H61.21 IMPACTED CERUMEN OF RIGHT EAR: ICD-10-CM

## 2025-04-25 DIAGNOSIS — Z00.00 ANNUAL PHYSICAL EXAM: ICD-10-CM

## 2025-04-25 DIAGNOSIS — N95.9 MENOPAUSAL AND POSTMENOPAUSAL DISORDER: ICD-10-CM

## 2025-04-25 LAB
ALBUMIN SERPL-MCNC: 4.2 G/DL (ref 3.4–5)
ALBUMIN/GLOB SERPL: 2.2 {RATIO} (ref 1.1–2.2)
ALP SERPL-CCNC: 107 U/L (ref 40–129)
ALT SERPL-CCNC: 24 U/L (ref 10–40)
ANION GAP SERPL CALCULATED.3IONS-SCNC: 9 MMOL/L (ref 3–16)
AST SERPL-CCNC: 28 U/L (ref 15–37)
BASOPHILS # BLD: 0.1 K/UL (ref 0–0.2)
BASOPHILS NFR BLD: 1.4 %
BILIRUB SERPL-MCNC: 0.5 MG/DL (ref 0–1)
BUN SERPL-MCNC: 13 MG/DL (ref 7–20)
CALCIUM SERPL-MCNC: 9.2 MG/DL (ref 8.3–10.6)
CHLORIDE SERPL-SCNC: 105 MMOL/L (ref 99–110)
CHOLEST SERPL-MCNC: 177 MG/DL (ref 0–199)
CO2 SERPL-SCNC: 28 MMOL/L (ref 21–32)
CREAT SERPL-MCNC: 0.7 MG/DL (ref 0.6–1.2)
DEPRECATED RDW RBC AUTO: 13 % (ref 12.4–15.4)
EOSINOPHIL # BLD: 0.2 K/UL (ref 0–0.6)
EOSINOPHIL NFR BLD: 4 %
GFR SERPLBLD CREATININE-BSD FMLA CKD-EPI: >90 ML/MIN/{1.73_M2}
GLUCOSE SERPL-MCNC: 84 MG/DL (ref 70–99)
HCT VFR BLD AUTO: 38.7 % (ref 36–48)
HDLC SERPL-MCNC: 80 MG/DL (ref 40–60)
HGB BLD-MCNC: 13.2 G/DL (ref 12–16)
LDLC SERPL CALC-MCNC: 88 MG/DL
LYMPHOCYTES # BLD: 1.8 K/UL (ref 1–5.1)
LYMPHOCYTES NFR BLD: 39.2 %
MCH RBC QN AUTO: 31.5 PG (ref 26–34)
MCHC RBC AUTO-ENTMCNC: 34.3 G/DL (ref 31–36)
MCV RBC AUTO: 91.9 FL (ref 80–100)
MONOCYTES # BLD: 0.4 K/UL (ref 0–1.3)
MONOCYTES NFR BLD: 7.6 %
NEUTROPHILS # BLD: 2.2 K/UL (ref 1.7–7.7)
NEUTROPHILS NFR BLD: 47.8 %
PLATELET # BLD AUTO: 263 K/UL (ref 135–450)
PMV BLD AUTO: 8 FL (ref 5–10.5)
POTASSIUM SERPL-SCNC: 4.7 MMOL/L (ref 3.5–5.1)
PROT SERPL-MCNC: 6.1 G/DL (ref 6.4–8.2)
RBC # BLD AUTO: 4.21 M/UL (ref 4–5.2)
SODIUM SERPL-SCNC: 142 MMOL/L (ref 136–145)
TRIGL SERPL-MCNC: 47 MG/DL (ref 0–150)
VLDLC SERPL CALC-MCNC: 9 MG/DL
WBC # BLD AUTO: 4.6 K/UL (ref 4–11)

## 2025-04-25 SDOH — ECONOMIC STABILITY: FOOD INSECURITY: WITHIN THE PAST 12 MONTHS, YOU WORRIED THAT YOUR FOOD WOULD RUN OUT BEFORE YOU GOT MONEY TO BUY MORE.: NEVER TRUE

## 2025-04-25 SDOH — ECONOMIC STABILITY: FOOD INSECURITY: WITHIN THE PAST 12 MONTHS, THE FOOD YOU BOUGHT JUST DIDN'T LAST AND YOU DIDN'T HAVE MONEY TO GET MORE.: NEVER TRUE

## 2025-04-25 ASSESSMENT — ENCOUNTER SYMPTOMS
SHORTNESS OF BREATH: 0
ABDOMINAL PAIN: 0
NAUSEA: 0
VOMITING: 0
CONSTIPATION: 0
COUGH: 0
DIARRHEA: 0

## 2025-04-25 ASSESSMENT — ANXIETY QUESTIONNAIRES
6. BECOMING EASILY ANNOYED OR IRRITABLE: NOT AT ALL
2. NOT BEING ABLE TO STOP OR CONTROL WORRYING: NOT AT ALL
4. TROUBLE RELAXING: NOT AT ALL
5. BEING SO RESTLESS THAT IT IS HARD TO SIT STILL: NOT AT ALL
3. WORRYING TOO MUCH ABOUT DIFFERENT THINGS: NOT AT ALL
GAD7 TOTAL SCORE: 0
7. FEELING AFRAID AS IF SOMETHING AWFUL MIGHT HAPPEN: NOT AT ALL
1. FEELING NERVOUS, ANXIOUS, OR ON EDGE: NOT AT ALL

## 2025-04-25 ASSESSMENT — PATIENT HEALTH QUESTIONNAIRE - PHQ9
1. LITTLE INTEREST OR PLEASURE IN DOING THINGS: NOT AT ALL
DEPRESSION UNABLE TO ASSESS: FUNCTIONAL CAPACITY MOTIVATION LIMITS ACCURACY
2. FEELING DOWN, DEPRESSED OR HOPELESS: NOT AT ALL
SUM OF ALL RESPONSES TO PHQ QUESTIONS 1-9: 0

## 2025-04-25 NOTE — PROGRESS NOTES
Kaycee Osuna  YOB: 1965    Date of Service:  4/25/2025    Chief Complaint:   Kaycee Osuna is a 59 y.o. female who presents for complete physical examination.    HPI:  Pt has concerns of new post-menopausal symptoms she is concerned about about would like a referral to OB/GYN. Symptoms consist of nipple changes, vaginal discharge and abdominal bloating. Pt states LMP was ~54yo. Pt has not had any menopasual symptoms in the past and is now 59. Pt does state that she started a new multi-vitamin before the symptoms started.     Pt would like some lab work for her physical today.     Wt Readings from Last 3 Encounters:   04/25/25 75.8 kg (167 lb)   03/25/25 73.9 kg (163 lb)   01/12/24 71.7 kg (158 lb)     BP Readings from Last 3 Encounters:   04/25/25 102/63   03/25/25 102/68   01/12/24 120/70       Patient Active Problem List   Diagnosis    Osteoarthritis of left ankle       Health Maintenance   Topic Date Due    Cervical cancer screen  Never done    Pneumococcal 50+ years Vaccine (1 of 1 - PCV) Never done    DTaP/Tdap/Td vaccine (2 - Td or Tdap) 10/22/2023    COVID-19 Vaccine (5 - 2024-25 season) 09/01/2024    HIV screen  04/25/2026 (Originally 12/12/1980)    Flu vaccine (Season Ended) 08/01/2025    Colorectal Cancer Screen  10/02/2025    Breast cancer screen  01/15/2026    Depression Screen  04/25/2026    Lipids  01/15/2029    Respiratory Syncytial Virus (RSV) Pregnant or age 60 yrs+ (1 - 1-dose 75+ series) 12/12/2038    Shingles vaccine  Completed    Hepatitis A vaccine  Aged Out    Hepatitis B vaccine  Aged Out    Hib vaccine  Aged Out    Polio vaccine  Aged Out    Meningococcal (ACWY) vaccine  Aged Out    Meningococcal B vaccine  Aged Out    Hepatitis C screen  Discontinued       Immunization History   Administered Date(s) Administered    COVID-19, MODERNA, 2024/25, (age 12y+), IM, 50mcg/0.5mL 11/27/2023    COVID-19, PFIZER PURPLE top, DILUTE for use, (age 12 y+), 30mcg/0.3mL 02/19/2021, 03/12/2021,

## 2025-04-28 ENCOUNTER — RESULTS FOLLOW-UP (OUTPATIENT)
Dept: FAMILY MEDICINE CLINIC | Age: 60
End: 2025-04-28

## 2025-07-20 LAB — NONINV COLON CA DNA+OCC BLD SCRN STL QL: NEGATIVE
